# Patient Record
Sex: FEMALE | URBAN - METROPOLITAN AREA
[De-identification: names, ages, dates, MRNs, and addresses within clinical notes are randomized per-mention and may not be internally consistent; named-entity substitution may affect disease eponyms.]

---

## 2022-05-23 ENCOUNTER — OFFICE VISIT (OUTPATIENT)
Dept: URBAN - METROPOLITAN AREA CLINIC 1 | Facility: CLINIC | Age: 72
End: 2022-05-23
Payer: COMMERCIAL

## 2022-05-23 DIAGNOSIS — H04.123 DRY EYE SYNDROME OF BILATERAL LACRIMAL GLANDS: ICD-10-CM

## 2022-05-23 DIAGNOSIS — H01.00A UNSPECIFIED BLEPHARITIS RIGHT EYE, UPPER AND LOWER EYELIDS: Primary | ICD-10-CM

## 2022-05-23 DIAGNOSIS — H25.13 AGE-RELATED NUCLEAR CATARACT, BILATERAL: ICD-10-CM

## 2022-05-23 DIAGNOSIS — H35.363 DRUSEN (DEGENERATIVE) OF MACULA, BILATERAL: ICD-10-CM

## 2022-05-23 DIAGNOSIS — H01.00B UNSPECIFIED BLEPHARITIS LEFT EYE, UPPER AND LOWER EYELIDS: ICD-10-CM

## 2022-05-23 PROCEDURE — 76519 ECHO EXAM OF EYE: CPT | Performed by: OPHTHALMOLOGY

## 2022-05-23 PROCEDURE — 99203 OFFICE O/P NEW LOW 30 MIN: CPT | Performed by: OPHTHALMOLOGY

## 2022-05-23 PROCEDURE — 92134 CPTRZ OPH DX IMG PST SGM RTA: CPT | Performed by: OPHTHALMOLOGY

## 2022-05-23 PROCEDURE — 92286 ANT SGM IMG I&R SPECLR MIC: CPT | Performed by: OPHTHALMOLOGY

## 2022-05-23 ASSESSMENT — VISUAL ACUITY
OS: 20/30
OD: 20/50

## 2022-05-23 ASSESSMENT — INTRAOCULAR PRESSURE
OS: 12
OD: 15

## 2022-05-23 NOTE — IMPRESSION/PLAN
Impression: Drusen (degenerative) of macula, bilateral: H35.363. Plan: Discussed diagnosis in detail with patient. Will continue to observe condition and or symptoms. Call if [address] worsens.

## 2022-05-23 NOTE — IMPRESSION/PLAN
Impression: Age-related nuclear cataract, bilateral: H25.13. Plan: Observe for now without intervention. The patient was advised to contact us if any change or worsening of vision. pt does need  the surgery but pt needs tune up on her cornea prior Repeat Soren and opd

## 2022-06-29 ENCOUNTER — OFFICE VISIT (OUTPATIENT)
Dept: URBAN - METROPOLITAN AREA CLINIC 1 | Facility: CLINIC | Age: 72
End: 2022-06-29
Payer: COMMERCIAL

## 2022-06-29 DIAGNOSIS — H25.13 AGE-RELATED NUCLEAR CATARACT, BILATERAL: Primary | ICD-10-CM

## 2022-06-29 DIAGNOSIS — H25.11 AGE-RELATED NUCLEAR CATARACT, RIGHT EYE: ICD-10-CM

## 2022-06-29 DIAGNOSIS — H01.00A UNSPECIFIED BLEPHARITIS RIGHT EYE, UPPER AND LOWER EYELIDS: ICD-10-CM

## 2022-06-29 DIAGNOSIS — H01.00B UNSPECIFIED BLEPHARITIS LEFT EYE, UPPER AND LOWER EYELIDS: ICD-10-CM

## 2022-06-29 DIAGNOSIS — H04.123 DRY EYE SYNDROME OF BILATERAL LACRIMAL GLANDS: ICD-10-CM

## 2022-06-29 PROCEDURE — 99214 OFFICE O/P EST MOD 30 MIN: CPT | Performed by: OPHTHALMOLOGY

## 2022-06-29 PROCEDURE — 76519 ECHO EXAM OF EYE: CPT | Performed by: OPHTHALMOLOGY

## 2022-06-29 RX ORDER — PREDNISOLONE ACETATE 10 MG/ML
1 % SUSPENSION/ DROPS OPHTHALMIC
Qty: 5 | Refills: 1 | Status: INACTIVE
Start: 2022-06-29 | End: 2022-06-29

## 2022-06-29 ASSESSMENT — VISUAL ACUITY
OD: 20/50
OS: 20/40

## 2022-06-29 ASSESSMENT — INTRAOCULAR PRESSURE
OS: 18
OD: 18

## 2022-06-29 NOTE — IMPRESSION/PLAN
Impression: Dry eye syndrome of bilateral lacrimal glands: H04.123. Plan: Has improved.  Pt to continue on AT;s

## 2022-06-29 NOTE — IMPRESSION/PLAN
Impression: Age-related nuclear cataract, bilateral: H25.13. Plan: Plan to perform cataract surgery in the right / left eye: Discussed the risks, benefits, and alternatives of cataract surgery. The patient stated a full understanding and a desire to proceed with the procedure. Biometry ordered for intraocular lens selection. Advised against driving until complete. Reviewed the increased risk of retinal detachment after cataract surgery and reviewed retinal detachment and general warnings and to contact us immediately should any of those develop. Pre-op drops; Lungs clear and chest clear in todays visit;  Lens: Vivity [id number] +24.0 D to both eyes

## 2022-06-29 NOTE — IMPRESSION/PLAN
Impression: Unspecified blepharitis right eye, upper and lower eyelids: H01.00A.  Plan: improved; continue AT;s

## 2022-09-07 ENCOUNTER — Encounter (OUTPATIENT)
Dept: URBAN - METROPOLITAN AREA SURGERY 2 | Facility: SURGERY | Age: 72
End: 2022-09-07
Payer: COMMERCIAL

## 2022-09-07 ENCOUNTER — PROCEDURE (OUTPATIENT)
Dept: URBAN - METROPOLITAN AREA SURGERY 1 | Facility: SURGERY | Age: 72
End: 2022-09-07
Payer: COMMERCIAL

## 2022-09-07 PROCEDURE — 66984 XCAPSL CTRC RMVL W/O ECP: CPT | Performed by: OPHTHALMOLOGY

## 2022-09-08 ENCOUNTER — POST-OPERATIVE VISIT (OUTPATIENT)
Dept: URBAN - METROPOLITAN AREA CLINIC 6 | Facility: CLINIC | Age: 72
End: 2022-09-08
Payer: COMMERCIAL

## 2022-09-08 DIAGNOSIS — Z48.810 ENCOUNTER FOR SURGICAL AFTERCARE FOLLOWING SURGERY ON THE SENSE ORGANS: Primary | ICD-10-CM

## 2022-09-08 PROCEDURE — 99024 POSTOP FOLLOW-UP VISIT: CPT | Performed by: OPTOMETRIST

## 2022-09-08 ASSESSMENT — INTRAOCULAR PRESSURE: OS: 23

## 2022-09-08 NOTE — IMPRESSION/PLAN
Impression: S/P Cataract Extraction by phacoemulsification with IOL placement OS - 1 Day. Encounter for surgical aftercare following surgery on a sense organ  Z48.810. Plan: S/P Cataract extraction - post-op day #1 - Use Ciprofloxacin and Prednisolone TID in affected eye(s) for two weeks. Use Ketorolac QID in affected eye(s) for 4 weeks. Advised no heavy lifting or strenuous activity for one week. Advised no swimming for three weeks. Use eye shield at night for the first week. Patient advised to call immediately for any problems including, but not limited to, pain, redness, and decreased vision. Advised to bring eyedrops to each visit. Patient stated an understanding of all the instructions. RTC for CE.

## 2022-09-13 ENCOUNTER — PROCEDURE (OUTPATIENT)
Dept: URBAN - METROPOLITAN AREA SURGERY 1 | Facility: SURGERY | Age: 72
End: 2022-09-13
Payer: COMMERCIAL

## 2022-09-13 ENCOUNTER — Encounter (OUTPATIENT)
Dept: URBAN - METROPOLITAN AREA SURGERY 2 | Facility: SURGERY | Age: 72
End: 2022-09-13
Payer: COMMERCIAL

## 2022-09-13 PROCEDURE — 66984 XCAPSL CTRC RMVL W/O ECP: CPT | Performed by: OPHTHALMOLOGY

## 2022-09-14 ENCOUNTER — POST-OPERATIVE VISIT (OUTPATIENT)
Dept: URBAN - METROPOLITAN AREA CLINIC 6 | Facility: CLINIC | Age: 72
End: 2022-09-14
Payer: COMMERCIAL

## 2022-09-14 DIAGNOSIS — Z96.1 PRESENCE OF INTRAOCULAR LENS: Primary | ICD-10-CM

## 2022-09-14 PROCEDURE — 99024 POSTOP FOLLOW-UP VISIT: CPT | Performed by: OPTOMETRIST

## 2022-09-14 ASSESSMENT — INTRAOCULAR PRESSURE
OS: 14
OD: 24

## 2022-09-14 NOTE — IMPRESSION/PLAN
Impression: S/P Cataract Extraction by phacoemulsification with IOL placement OD - 1 Day. Presence of intraocular lens  Z96.1. Plan: S/P Cataract extraction - post-op day #1 - Use Ciprofloxacin TID OD and Prednisolone TID OU. Use Ketorolac TID OU. Advised no heavy lifting or strenuous activity for one week. Advised no swimming for three weeks. Use eye shield at night for the first week. Patient advised to call immediately for any problems including, but not limited to, pain, redness, and decreased vision. Advised to bring eyedrops to each visit. Patient stated an understanding of all the instructions. RTC to Dr. Serenity Quintero.
EMR, team rounds, previous admission RD notes

## 2022-09-28 ENCOUNTER — POST-OPERATIVE VISIT (OUTPATIENT)
Dept: URBAN - METROPOLITAN AREA CLINIC 1 | Facility: CLINIC | Age: 72
End: 2022-09-28
Payer: COMMERCIAL

## 2022-09-28 DIAGNOSIS — Z96.1 PRESENCE OF INTRAOCULAR LENS: Primary | ICD-10-CM

## 2022-09-28 PROCEDURE — 99024 POSTOP FOLLOW-UP VISIT: CPT | Performed by: OPHTHALMOLOGY

## 2022-09-28 ASSESSMENT — INTRAOCULAR PRESSURE
OD: 14
OS: 15

## 2022-09-28 NOTE — IMPRESSION/PLAN
Impression: S/P Cataract Extraction by phacoemulsification with IOL placement OD - 15 Days. Presence of intraocular lens  Z96.1. Plan: Patient to use AT' QID OU --Advised patient to use artificial tears for comfort.

## 2022-11-14 ENCOUNTER — POST-OPERATIVE VISIT (OUTPATIENT)
Dept: URBAN - METROPOLITAN AREA CLINIC 1 | Facility: CLINIC | Age: 72
End: 2022-11-14
Payer: COMMERCIAL

## 2022-11-14 DIAGNOSIS — Z96.1 PRESENCE OF INTRAOCULAR LENS: Primary | ICD-10-CM

## 2022-11-14 PROCEDURE — 99024 POSTOP FOLLOW-UP VISIT: CPT | Performed by: OPHTHALMOLOGY

## 2022-11-14 RX ORDER — NEOMYCIN, POLYMYXIN B SULFATES, DEXAMETHASONE 1; 3.5; 1 MG/G; MG/G; [USP'U]/G
OINTMENT OPHTHALMIC
Qty: 5 | Refills: 3 | Status: ACTIVE
Start: 2022-11-14

## 2022-11-14 ASSESSMENT — VISUAL ACUITY
OS: 20/25
OD: 20/25

## 2022-11-14 ASSESSMENT — INTRAOCULAR PRESSURE
OS: 13
OD: 15

## 2023-02-22 ENCOUNTER — OFFICE VISIT (OUTPATIENT)
Dept: URBAN - METROPOLITAN AREA CLINIC 1 | Facility: CLINIC | Age: 73
End: 2023-02-22
Payer: COMMERCIAL

## 2023-02-22 DIAGNOSIS — H35.363 DRUSEN (DEGENERATIVE) OF MACULA, BILATERAL: ICD-10-CM

## 2023-02-22 DIAGNOSIS — H01.00A UNSPECIFIED BLEPHARITIS RIGHT EYE, UPPER AND LOWER EYELIDS: Primary | ICD-10-CM

## 2023-02-22 DIAGNOSIS — H04.123 DRY EYE SYNDROME OF BILATERAL LACRIMAL GLANDS: ICD-10-CM

## 2023-02-22 DIAGNOSIS — H01.00B UNSPECIFIED BLEPHARITIS LEFT EYE, UPPER AND LOWER EYELIDS: ICD-10-CM

## 2023-02-22 PROCEDURE — 99212 OFFICE O/P EST SF 10 MIN: CPT | Performed by: OPHTHALMOLOGY

## 2023-02-22 PROCEDURE — 92134 CPTRZ OPH DX IMG PST SGM RTA: CPT | Performed by: OPHTHALMOLOGY

## 2023-02-22 ASSESSMENT — INTRAOCULAR PRESSURE
OD: 10
OS: 13

## 2023-02-22 NOTE — IMPRESSION/PLAN
Impression: Drusen (degenerative) of macula, bilateral: H35.363. Plan: Return immediately for any worsening symptoms or change in vision. OCT reviewed stable

## 2023-07-27 ENCOUNTER — OFFICE VISIT (OUTPATIENT)
Dept: URBAN - METROPOLITAN AREA CLINIC 1 | Facility: CLINIC | Age: 73
End: 2023-07-27
Payer: COMMERCIAL

## 2023-07-27 DIAGNOSIS — H00.014 HORDEOLUM EXTERNUM LEFT UPPER EYELID: Primary | ICD-10-CM

## 2023-07-27 DIAGNOSIS — H00.011 HORDEOLUM EXTERNUM RIGHT UPPER EYELID: ICD-10-CM

## 2023-07-27 PROCEDURE — 99213 OFFICE O/P EST LOW 20 MIN: CPT | Performed by: OPHTHALMOLOGY

## 2023-07-27 RX ORDER — AMOXICILLIN AND CLAVULANATE POTASSIUM 500; 125 MG/1; 1/1
TABLET, FILM COATED ORAL
Qty: 14 | Refills: 0 | Status: ACTIVE
Start: 2023-07-27

## 2023-07-27 ASSESSMENT — INTRAOCULAR PRESSURE
OD: 15
OS: 22

## 2023-08-07 ENCOUNTER — PROCEDURE (OUTPATIENT)
Dept: URBAN - METROPOLITAN AREA CLINIC 3 | Facility: CLINIC | Age: 73
End: 2023-08-07
Payer: COMMERCIAL

## 2023-08-07 DIAGNOSIS — H00.14 CHALAZION LEFT UPPER EYELID: Primary | ICD-10-CM

## 2023-08-07 PROCEDURE — 67800 REMOVE EYELID LESION: CPT | Performed by: OPHTHALMOLOGY

## 2023-08-07 RX ORDER — ERYTHROMYCIN 5 MG/G
OINTMENT OPHTHALMIC
Qty: 3.5 | Refills: 1 | Status: ACTIVE
Start: 2023-08-07